# Patient Record
Sex: MALE | Race: WHITE | Employment: PART TIME | URBAN - METROPOLITAN AREA
[De-identification: names, ages, dates, MRNs, and addresses within clinical notes are randomized per-mention and may not be internally consistent; named-entity substitution may affect disease eponyms.]

---

## 2017-01-05 ENCOUNTER — APPOINTMENT (OUTPATIENT)
Dept: CT IMAGING | Facility: HOSPITAL | Age: 37
End: 2017-01-05
Attending: PHYSICIAN ASSISTANT
Payer: MEDICARE

## 2017-01-05 ENCOUNTER — APPOINTMENT (OUTPATIENT)
Dept: GENERAL RADIOLOGY | Facility: HOSPITAL | Age: 37
End: 2017-01-05
Attending: PHYSICIAN ASSISTANT
Payer: MEDICARE

## 2017-01-05 ENCOUNTER — HOSPITAL ENCOUNTER (EMERGENCY)
Facility: HOSPITAL | Age: 37
Discharge: HOME | End: 2017-01-05
Attending: EMERGENCY MEDICINE
Payer: MEDICARE

## 2017-01-05 VITALS
HEART RATE: 74 BPM | DIASTOLIC BLOOD PRESSURE: 68 MMHG | WEIGHT: 155 LBS | OXYGEN SATURATION: 100 % | RESPIRATION RATE: 16 BRPM | TEMPERATURE: 97 F | HEIGHT: 67 IN | BODY MASS INDEX: 24.33 KG/M2 | SYSTOLIC BLOOD PRESSURE: 124 MMHG

## 2017-01-05 DIAGNOSIS — S20.229A BACK CONTUSION, UNSPECIFIED LATERALITY, INITIAL ENCOUNTER: ICD-10-CM

## 2017-01-05 DIAGNOSIS — S16.1XXA CERVICAL STRAIN, INITIAL ENCOUNTER: Primary | ICD-10-CM

## 2017-01-05 PROCEDURE — 96372 THER/PROPH/DIAG INJ SC/IM: CPT

## 2017-01-05 PROCEDURE — 99284 EMERGENCY DEPT VISIT MOD MDM: CPT

## 2017-01-05 PROCEDURE — 72125 CT NECK SPINE W/O DYE: CPT

## 2017-01-05 PROCEDURE — 71020 XR CHEST PA + LAT CHEST (CPT=71020): CPT

## 2017-01-05 RX ORDER — HYDROCODONE BITARTRATE AND ACETAMINOPHEN 5; 325 MG/1; MG/1
1 TABLET ORAL EVERY 6 HOURS PRN
Qty: 20 TABLET | Refills: 0 | Status: SHIPPED | OUTPATIENT
Start: 2017-01-05 | End: 2017-01-12

## 2017-01-05 RX ORDER — HYDROCODONE BITARTRATE AND ACETAMINOPHEN 5; 325 MG/1; MG/1
1 TABLET ORAL ONCE
Status: COMPLETED | OUTPATIENT
Start: 2017-01-05 | End: 2017-01-05

## 2017-01-05 RX ORDER — CYCLOBENZAPRINE HCL 10 MG
10 TABLET ORAL 3 TIMES DAILY PRN
Qty: 20 TABLET | Refills: 0 | Status: SHIPPED | OUTPATIENT
Start: 2017-01-05 | End: 2017-01-12

## 2017-01-05 RX ORDER — KETOROLAC TROMETHAMINE 30 MG/ML
60 INJECTION, SOLUTION INTRAMUSCULAR; INTRAVENOUS ONCE
Status: COMPLETED | OUTPATIENT
Start: 2017-01-05 | End: 2017-01-05

## 2017-01-05 NOTE — ED PROVIDER NOTES
I reviewed that chart and discussed the case.   I have examined the patient and noted patient is moving upper exam is without difficulty with pulse 2+ strength 5 out of 5 capillary refill less than 2 seconds lungs are normal cardia vascular exam shows regul

## 2017-01-05 NOTE — ED PROVIDER NOTES
Patient Seen in: [facility] Emergency Department    History   Patient presents with:  Contusion (musculoskeletal)    Stated Complaint: slipped on ice- shoulder pain    HPI    Patient is a pleasant 36-year-old male.   Patient arrives for evaluation of of beer per week      Review of Systems    Positive for stated complaint: slipped on ice- shoulder pain  Other systems are as noted in HPI. Constitutional and vital signs reviewed. All other systems reviewed and negative except as noted above.     PSF (CPT=71020)  INDICATIONS:  slipped on ice- shoulder pain  COMPARISON:  None. TECHNIQUE:  PA and lateral chest radiographs were obtained. PATIENT STATED HISTORY:  Pt states he fell on the ice 2 days ago.   Pt states since that time he has  pain in the neck There are multiple lymph nodes in the bilateral anterior and posterior vials the majority which are nonenlarged however there is a 1.5 x 2.4 x 1.5 cm enlarged left jugular region node noted near the angle of the mandible.  Incidental note is made of a coupl Prescribed:  Discharge Medication List as of 1/5/2017  5:59 PM    START taking these medications    HYDROcodone-acetaminophen 5-325 MG Oral Tab  Take 1 tablet by mouth every 6 (six) hours as needed for Pain., Script printed, Disp-20 tablet, R-0    Cycloben

## 2017-01-05 NOTE — ED INITIAL ASSESSMENT (HPI)
Pt states he fell on the ice 2 days ago. Pt states since that time he has pain in the neck. Pt has a history of 6 bulging discs in the cervical spine. Pt has pain with ROM of the neck and shoulders with increasing pain upon wakening.   Pt is starting phy

## 2017-01-16 ENCOUNTER — TELEPHONE (OUTPATIENT)
Dept: FAMILY MEDICINE CLINIC | Facility: CLINIC | Age: 37
End: 2017-01-16

## 2017-01-16 ENCOUNTER — MED REC SCAN ONLY (OUTPATIENT)
Dept: FAMILY MEDICINE CLINIC | Facility: CLINIC | Age: 37
End: 2017-01-16

## 2017-01-16 NOTE — TELEPHONE ENCOUNTER
See above. We have reports from [facility] 1/5/17----CT of C-spine and CXR. I'm not sure what else he is referring to. HAVE YOU SEEN ANYTHING? ?

## 2017-01-18 ENCOUNTER — OFFICE VISIT (OUTPATIENT)
Dept: FAMILY MEDICINE CLINIC | Facility: CLINIC | Age: 37
End: 2017-01-18

## 2017-01-18 ENCOUNTER — APPOINTMENT (OUTPATIENT)
Dept: LAB | Age: 37
End: 2017-01-18
Attending: FAMILY MEDICINE
Payer: MEDICARE

## 2017-01-18 VITALS
OXYGEN SATURATION: 99 % | BODY MASS INDEX: 24 KG/M2 | DIASTOLIC BLOOD PRESSURE: 80 MMHG | HEART RATE: 93 BPM | SYSTOLIC BLOOD PRESSURE: 120 MMHG | WEIGHT: 154.38 LBS | TEMPERATURE: 98 F

## 2017-01-18 DIAGNOSIS — Z13.220 SCREENING FOR HYPERCHOLESTEROLEMIA: ICD-10-CM

## 2017-01-18 DIAGNOSIS — Z00.00 LABORATORY EXAM ORDERED AS PART OF ROUTINE GENERAL MEDICAL EXAMINATION: ICD-10-CM

## 2017-01-18 DIAGNOSIS — S16.1XXD CERVICAL STRAIN, ACUTE, SUBSEQUENT ENCOUNTER: Primary | ICD-10-CM

## 2017-01-18 LAB
ALBUMIN SERPL-MCNC: 4.3 G/DL (ref 3.5–4.8)
ALP LIVER SERPL-CCNC: 62 U/L (ref 45–117)
ALT SERPL-CCNC: 58 U/L (ref 17–63)
AST SERPL-CCNC: 34 U/L (ref 15–41)
BILIRUB SERPL-MCNC: 1.2 MG/DL (ref 0.1–2)
BUN BLD-MCNC: 12 MG/DL (ref 8–20)
CALCIUM BLD-MCNC: 9 MG/DL (ref 8.3–10.3)
CHLORIDE: 102 MMOL/L (ref 101–111)
CHOLEST SMN-MCNC: 212 MG/DL (ref ?–200)
CO2: 25 MMOL/L (ref 22–32)
CREAT BLD-MCNC: 0.83 MG/DL (ref 0.7–1.3)
GLUCOSE BLD-MCNC: 84 MG/DL (ref 70–99)
HDLC SERPL-MCNC: 71 MG/DL (ref 45–?)
HDLC SERPL: 2.99 {RATIO} (ref ?–4.97)
LDLC SERPL CALC-MCNC: 121 MG/DL (ref ?–130)
M PROTEIN MFR SERPL ELPH: 7.5 G/DL (ref 6.1–8.3)
NONHDLC SERPL-MCNC: 141 MG/DL (ref ?–130)
POTASSIUM SERPL-SCNC: 3.7 MMOL/L (ref 3.6–5.1)
SODIUM SERPL-SCNC: 135 MMOL/L (ref 136–144)
TRIGLYCERIDES: 100 MG/DL (ref ?–150)
VLDL: 20 MG/DL (ref 5–40)

## 2017-01-18 PROCEDURE — 80061 LIPID PANEL: CPT

## 2017-01-18 PROCEDURE — 36415 COLL VENOUS BLD VENIPUNCTURE: CPT

## 2017-01-18 PROCEDURE — 99214 OFFICE O/P EST MOD 30 MIN: CPT | Performed by: FAMILY MEDICINE

## 2017-01-18 PROCEDURE — 80053 COMPREHEN METABOLIC PANEL: CPT

## 2017-01-18 NOTE — PROGRESS NOTES
Latoya Ng. is a 29year old male. Patient presents with: Other: ongoing neck injury from a while ago--getting worse. ...first initial injury was approx 1 year ago--pt states he has seen neurologist, not sleeping well. ...room 1      HPI:   Kristie NEURO: denies headaches    EXAM:   /80 mmHg  Pulse 93  Temp(Src) 97.7 °F (36.5 °C) (Tympanic)  Wt 154 lb 6 oz  SpO2 99%  GENERAL: well developed, well nourished,in no apparent distress  SKIN: no rashes,no suspicious lesionsl  NECK: supple, no cervi fracture identified. Slight retrolisthesis of C3 relative to C2 noted. If there is high in the cervical pain and there is concern for ligamentous instability flexion and extension    views could be performed.      2.  Nonspecific enlarged left jugular regio

## 2017-01-23 ENCOUNTER — OFFICE VISIT (OUTPATIENT)
Dept: FAMILY MEDICINE CLINIC | Facility: CLINIC | Age: 37
End: 2017-01-23

## 2017-01-23 VITALS
OXYGEN SATURATION: 98 % | BODY MASS INDEX: 24 KG/M2 | SYSTOLIC BLOOD PRESSURE: 120 MMHG | HEART RATE: 76 BPM | TEMPERATURE: 98 F | WEIGHT: 153 LBS | DIASTOLIC BLOOD PRESSURE: 72 MMHG

## 2017-01-23 DIAGNOSIS — F41.9 CHRONIC ANXIETY: ICD-10-CM

## 2017-01-23 DIAGNOSIS — M43.6 NECK STIFFNESS: Primary | ICD-10-CM

## 2017-01-23 PROCEDURE — 99214 OFFICE O/P EST MOD 30 MIN: CPT | Performed by: FAMILY MEDICINE

## 2017-01-23 NOTE — PATIENT INSTRUCTIONS
I would recommend the patient continue physical therapy. He may use moist heat and ibuprofen as needed for pain or discomfort  Patient was instructed in diaphragmatic breathing. Discussed muscle activation.   Patient was given website information to look

## 2017-01-23 NOTE — PROGRESS NOTES
HPI:    Patient ID: Latoya Cerda is a 29year old male. Patient presents with:  Pain: neck pain   patient has a long history of chronic neck pain with radiology studies dating back to 2013.   At that time plain films of the C-spine showed loss of c ( Slight restriction in rotational range of motion but symmetrical), tenderness ( No pain with axial loading) and spasm ( Tight, tender right paracervical). He exhibits no bony tenderness and no pain.      INITIAL MOVEMENT PATTERN: 333 jaw RIGHT,  333 jaw L muscle activation. Patient was given website information to look into this as I feel it would be helpful for him. Schedule for formal activation if desired. Discussed the role of anxiety and feeling of overall well-being.   Patient declines the need for

## 2017-01-25 ENCOUNTER — PATIENT OUTREACH (OUTPATIENT)
Dept: FAMILY MEDICINE CLINIC | Facility: CLINIC | Age: 37
End: 2017-01-25

## 2017-02-01 ENCOUNTER — OFFICE VISIT (OUTPATIENT)
Dept: FAMILY MEDICINE CLINIC | Facility: CLINIC | Age: 37
End: 2017-02-01

## 2017-02-01 VITALS
WEIGHT: 153 LBS | BODY MASS INDEX: 24 KG/M2 | SYSTOLIC BLOOD PRESSURE: 120 MMHG | TEMPERATURE: 98 F | OXYGEN SATURATION: 99 % | DIASTOLIC BLOOD PRESSURE: 70 MMHG | HEART RATE: 78 BPM

## 2017-02-01 DIAGNOSIS — M99.02 THORACIC SEGMENT DYSFUNCTION: ICD-10-CM

## 2017-02-01 DIAGNOSIS — F41.9 CHRONIC ANXIETY: ICD-10-CM

## 2017-02-01 DIAGNOSIS — M99.01 CERVICAL SEGMENT DYSFUNCTION: ICD-10-CM

## 2017-02-01 DIAGNOSIS — M99.03 SOMATIC DYSFUNCTION OF LUMBAR REGION: ICD-10-CM

## 2017-02-01 DIAGNOSIS — M99.00 HEAD REGION SOMATIC DYSFUNCTION: ICD-10-CM

## 2017-02-01 DIAGNOSIS — M43.6 NECK STIFFNESS: Primary | ICD-10-CM

## 2017-02-01 DIAGNOSIS — M99.08 RIB CAGE REGION SOMATIC DYSFUNCTION: ICD-10-CM

## 2017-02-01 PROCEDURE — 98929 OSTEOPATH MANJ 9-10 REGIONS: CPT | Performed by: FAMILY MEDICINE

## 2017-02-01 PROCEDURE — 99213 OFFICE O/P EST LOW 20 MIN: CPT | Performed by: FAMILY MEDICINE

## 2017-02-01 NOTE — PROGRESS NOTES
HPI:    Patient ID: Valdemar Penaloza. is a 29year old male. Patient presents with:  Neck Pain: MAT----    HPI patient with chronic neck pain for the last 7-8 months, records note neck issues for at least the last several years.   Unremarkable MRI and p rapid and/or pressured. He is hyperactive.  Cognition and memory are normal.       COMPLAINT: chronic neck stiffness    INITIAL MOVEMENT PATTERN: 333 jaw RIGHT,  333 jaw LEFT  BREATHING: thoracic PATTERN  PSOAS:  0 RIGHT, 0 LEFT  GLUTES:  0 RIGHT, 0 LEFT  H again instructed in diaphragmatic activation and abdominal breathing pattern  Patient instructed in zone 1 self activation techniques. We discussed relaxation breathing, journaling as well as individual psychotherapy.   Patient was again advised to contact

## 2017-02-01 NOTE — PATIENT INSTRUCTIONS
Patient was again instructed in diaphragmatic activation and abdominal breathing pattern  Patient instructed in zone 1 self activation techniques. We discussed relaxation breathing, journaling as well as individual psychotherapy.   Patient was again advise

## 2017-02-08 ENCOUNTER — TELEPHONE (OUTPATIENT)
Dept: FAMILY MEDICINE CLINIC | Facility: CLINIC | Age: 37
End: 2017-02-08

## 2017-02-10 ENCOUNTER — MED REC SCAN ONLY (OUTPATIENT)
Dept: FAMILY MEDICINE CLINIC | Facility: CLINIC | Age: 37
End: 2017-02-10

## 2017-04-27 ENCOUNTER — HOSPITAL ENCOUNTER (EMERGENCY)
Facility: HOSPITAL | Age: 37
Discharge: HOME | End: 2017-04-27
Attending: EMERGENCY MEDICINE
Payer: MEDICARE

## 2017-04-27 ENCOUNTER — APPOINTMENT (OUTPATIENT)
Dept: GENERAL RADIOLOGY | Facility: HOSPITAL | Age: 37
End: 2017-04-27
Attending: EMERGENCY MEDICINE
Payer: MEDICARE

## 2017-04-27 VITALS
DIASTOLIC BLOOD PRESSURE: 73 MMHG | HEIGHT: 67 IN | TEMPERATURE: 97 F | SYSTOLIC BLOOD PRESSURE: 122 MMHG | BODY MASS INDEX: 25.11 KG/M2 | WEIGHT: 160 LBS | OXYGEN SATURATION: 98 % | HEART RATE: 62 BPM | RESPIRATION RATE: 16 BRPM

## 2017-04-27 DIAGNOSIS — M54.2 NECK PAIN: Primary | ICD-10-CM

## 2017-04-27 DIAGNOSIS — M54.6 ACUTE MIDLINE THORACIC BACK PAIN: ICD-10-CM

## 2017-04-27 PROCEDURE — 72072 X-RAY EXAM THORAC SPINE 3VWS: CPT

## 2017-04-27 PROCEDURE — 72050 X-RAY EXAM NECK SPINE 4/5VWS: CPT

## 2017-04-27 PROCEDURE — 99283 EMERGENCY DEPT VISIT LOW MDM: CPT

## 2017-04-27 PROCEDURE — 71020 XR CHEST PA + LAT CHEST (CPT=71020): CPT

## 2017-04-27 RX ORDER — IBUPROFEN 600 MG/1
600 TABLET ORAL EVERY 8 HOURS PRN
Qty: 30 TABLET | Refills: 0 | Status: SHIPPED | OUTPATIENT
Start: 2017-04-27 | End: 2017-05-04

## 2017-04-27 RX ORDER — HYDROCODONE BITARTRATE AND ACETAMINOPHEN 5; 325 MG/1; MG/1
1-2 TABLET ORAL EVERY 4 HOURS PRN
Qty: 30 TABLET | Refills: 0 | Status: SHIPPED | OUTPATIENT
Start: 2017-04-27 | End: 2017-05-04

## 2017-04-27 RX ORDER — IBUPROFEN 800 MG/1
800 TABLET ORAL ONCE
Status: COMPLETED | OUTPATIENT
Start: 2017-04-27 | End: 2017-04-27

## 2017-04-27 RX ORDER — HYDROCODONE BITARTRATE AND ACETAMINOPHEN 5; 300 MG/1; MG/1
TABLET ORAL
COMMUNITY
End: 2017-09-20 | Stop reason: ALTCHOICE

## 2017-04-27 NOTE — ED NOTES
Pt reevaluated by er physician. informed of his test reports and plan of care. For discharge. Pt verbalizing understanding.

## 2017-04-27 NOTE — ED PROVIDER NOTES
Patient Seen in: [facility] Emergency Department    History   Patient presents with:  Neck Pain (musculoskeletal, neurologic)  Back Pain (musculoskeletal)    Stated Complaint: neck and back pain    HPI    43-year-old male presents to the emergency d above.    PSFH elements reviewed from today and agreed except as otherwise stated in HPI.     Physical Exam       ED Triage Vitals   BP 04/27/17 1311 122/70 mmHg   Pulse 04/27/17 1311 68   Resp 04/27/17 1311 16   Temp 04/27/17 1311 96.8 °F (36 °C)   Temp sr back pain    Disposition:  Discharge    Follow-up:  RACH Castillo/ [address]  [phone] 94    Call in 1 day        Medications Prescribed:  Current Discharge Medication List    START taking these medications    ibup

## 2017-04-27 NOTE — ED INITIAL ASSESSMENT (HPI)
Complaining of neck and upper back pain x 6 month. Worst in the morning. Denies any numbness in the extremities. denies any loss of bowel or bladder control. Ambulatory.

## 2017-05-29 ENCOUNTER — HOSPITAL ENCOUNTER (EMERGENCY)
Facility: HOSPITAL | Age: 37
Discharge: HOME | End: 2017-05-29
Attending: EMERGENCY MEDICINE
Payer: MEDICARE

## 2017-05-29 VITALS
OXYGEN SATURATION: 100 % | WEIGHT: 159.81 LBS | TEMPERATURE: 98 F | DIASTOLIC BLOOD PRESSURE: 82 MMHG | SYSTOLIC BLOOD PRESSURE: 147 MMHG | BODY MASS INDEX: 25.08 KG/M2 | HEIGHT: 67.01 IN | RESPIRATION RATE: 20 BRPM | HEART RATE: 79 BPM

## 2017-05-29 DIAGNOSIS — M54.2 NECK PAIN: Primary | ICD-10-CM

## 2017-05-29 PROCEDURE — 99283 EMERGENCY DEPT VISIT LOW MDM: CPT

## 2017-05-29 RX ORDER — HYDROCODONE BITARTRATE AND ACETAMINOPHEN 5; 325 MG/1; MG/1
1-2 TABLET ORAL EVERY 4 HOURS PRN
Qty: 20 TABLET | Refills: 0 | Status: SHIPPED | OUTPATIENT
Start: 2017-05-29 | End: 2017-06-05

## 2017-05-29 NOTE — ED PROVIDER NOTES
Patient Seen in: [facility] Emergency Department    History   Patient presents with:  Medication Administration    Stated Complaint: neck pain, would like pain medication refill    HPI    28-year-old male presents emergency with chief complaint of n 10 Cans of beer per week      Review of Systems    Positive for stated complaint: neck pain, would like pain medication refill  Other systems are as noted in HPI. Constitutional and vital signs reviewed.       All other systems reviewed and negative ex supportive care and return to ER if any change worsening symptoms. Patient well-appearing and was discharged good condition.         Disposition and Plan     Clinical Impression:  Neck pain  (primary encounter diagnosis)    Disposition:  Discharge    [facility]

## 2017-08-06 ENCOUNTER — HOSPITAL ENCOUNTER (EMERGENCY)
Facility: HOSPITAL | Age: 37
Discharge: HOME | End: 2017-08-06
Attending: EMERGENCY MEDICINE
Payer: MEDICARE

## 2017-08-06 VITALS
WEIGHT: 160 LBS | OXYGEN SATURATION: 100 % | HEIGHT: 67 IN | DIASTOLIC BLOOD PRESSURE: 83 MMHG | BODY MASS INDEX: 25.11 KG/M2 | RESPIRATION RATE: 18 BRPM | SYSTOLIC BLOOD PRESSURE: 120 MMHG | HEART RATE: 79 BPM | TEMPERATURE: 99 F

## 2017-08-06 DIAGNOSIS — G89.29 CHRONIC NECK PAIN: Primary | ICD-10-CM

## 2017-08-06 DIAGNOSIS — M54.2 CHRONIC NECK PAIN: Primary | ICD-10-CM

## 2017-08-06 PROCEDURE — 99283 EMERGENCY DEPT VISIT LOW MDM: CPT

## 2017-08-06 RX ORDER — HYDROCODONE BITARTRATE AND ACETAMINOPHEN 5; 325 MG/1; MG/1
1-2 TABLET ORAL EVERY 4 HOURS PRN
Qty: 20 TABLET | Refills: 0 | Status: SHIPPED | OUTPATIENT
Start: 2017-08-06 | End: 2017-08-13

## 2017-08-06 RX ORDER — IBUPROFEN 800 MG/1
800 TABLET ORAL EVERY 8 HOURS PRN
Qty: 30 TABLET | Refills: 0 | Status: SHIPPED | OUTPATIENT
Start: 2017-08-06 | End: 2017-09-20 | Stop reason: ALTCHOICE

## 2017-08-06 NOTE — ED INITIAL ASSESSMENT (HPI)
Pt c/o neck pain, was diagnosed with 6 bulging discs in his neck from MRI 8 months ago. Reports numbness and tingling in neck. Cam Magallanes off roof 1 month ago and seen in ED.

## 2017-08-06 NOTE — ED PROVIDER NOTES
Patient Seen in: [facility] Emergency Department    History   Patient presents with:  Neck Pain (musculoskeletal, neurologic)    Stated Complaint: neck pain    HPI    71-year-old male with a history of chronic neck pain who currently does not have a (37 °C)  Temp src: Tympanic  SpO2: 100 %  O2 Device: None (Room air)    Current:/83   Pulse 79   Temp 98.6 °F (37 °C) (Tympanic)   Resp 18   Ht 170.2 cm (5' 7\")   Wt 72.6 kg   SpO2 100%   BMI 25.06 kg/m²         Physical Exam    General appearance:

## 2017-08-15 ENCOUNTER — OFFICE VISIT (OUTPATIENT)
Dept: NEUROLOGY | Facility: CLINIC | Age: 37
End: 2017-08-15

## 2017-08-15 VITALS
BODY MASS INDEX: 24.71 KG/M2 | WEIGHT: 163 LBS | HEART RATE: 79 BPM | RESPIRATION RATE: 16 BRPM | DIASTOLIC BLOOD PRESSURE: 75 MMHG | SYSTOLIC BLOOD PRESSURE: 114 MMHG | HEIGHT: 68 IN

## 2017-08-15 DIAGNOSIS — M54.2 CHRONIC MIDLINE POSTERIOR NECK PAIN: Primary | ICD-10-CM

## 2017-08-15 DIAGNOSIS — M47.812 FACET ARTHROPATHY, CERVICAL: ICD-10-CM

## 2017-08-15 DIAGNOSIS — M62.838 CERVICAL PARASPINAL MUSCLE SPASM: ICD-10-CM

## 2017-08-15 DIAGNOSIS — G89.29 CHRONIC MIDLINE POSTERIOR NECK PAIN: Primary | ICD-10-CM

## 2017-08-15 PROCEDURE — 99204 OFFICE O/P NEW MOD 45 MIN: CPT | Performed by: OTHER

## 2017-08-15 RX ORDER — CYCLOBENZAPRINE HCL 10 MG
10 TABLET ORAL NIGHTLY
Qty: 30 TABLET | Refills: 0 | Status: SHIPPED | OUTPATIENT
Start: 2017-08-15 | End: 2017-09-20 | Stop reason: ALTCHOICE

## 2017-08-15 RX ORDER — GABAPENTIN 300 MG/1
CAPSULE ORAL
Qty: 90 CAPSULE | Refills: 0 | Status: SHIPPED | OUTPATIENT
Start: 2017-08-15 | End: 2017-09-20 | Stop reason: ALTCHOICE

## 2017-08-15 NOTE — H&P
[facility] Patient / Consult Visit    Ran Hassan. is a 40year old male.                          Referring MD: None  Patient presents with:  Neck Pain: C/O of chronic neck pain with radiation to the shoulder blades,arms w disorder [facility]    • Bulging of cervical intervertebral disc     c1-c6   • Depression    • Insomnia      Past Surgical History:  No date: HERNIA SURGERY Left      Comment: inguinal  Smoking status: Current Some Day Smoker XII  Optic:    Pupils: equally round and reactive to light with direct and consensual responses, normal accomodation   Visual acuity: Normal              Visual fields: Normal  Oculomotor/Trochlear/Abducens:    Eye Movements: EOMI without nystagmus  Trigem arthropathy. No significant central canal stenosis. C4-C5: Disc desiccation and loss of disc height with central disc protrusion via an annular tear with compression of the ventral aspect of the thecal sac.  Mild bilateral hypertrophic facet joint degene Patient will also be referred to pain management and was recommended to discuss narcotic pain medication with PCP and or pain management.     (M54.2,  G89.29) Chronic midline posterior neck pain  (primary encounter diagnosis)  Plan: Ankit SERNA

## 2017-08-15 NOTE — PATIENT INSTRUCTIONS
Set up appointment with pain management, Dr. Glen Kim at earliest convenience  Start Flexeril 10 mg nightly for neck muscle spasm   Start gabapentin 300 mg as follows: Start at 300 mg nightly x1 week, increased to 300 mg twice day x1 week, then increase to 300 3-10 days. It is highly recommended patients contact their insurance carrier directly to determine coverage. If test is done without insurance authorization, patient may be responsible for the entire amount billed.       Precertification and Prior [facility]

## 2017-09-20 ENCOUNTER — OFFICE VISIT (OUTPATIENT)
Dept: SURGERY | Facility: CLINIC | Age: 37
End: 2017-09-20

## 2017-09-20 VITALS
WEIGHT: 160 LBS | OXYGEN SATURATION: 98 % | HEIGHT: 67 IN | RESPIRATION RATE: 16 BRPM | HEART RATE: 94 BPM | BODY MASS INDEX: 25.11 KG/M2

## 2017-09-20 DIAGNOSIS — M54.12 CERVICAL RADICULITIS: Primary | ICD-10-CM

## 2017-09-20 DIAGNOSIS — M54.14 THORACIC RADICULITIS: ICD-10-CM

## 2017-09-20 PROCEDURE — 99203 OFFICE O/P NEW LOW 30 MIN: CPT | Performed by: ANESTHESIOLOGY

## 2017-09-20 RX ORDER — METHYLPREDNISOLONE 4 MG/1
TABLET ORAL
Qty: 1 PACKAGE | Refills: 0 | Status: SHIPPED | OUTPATIENT
Start: 2017-09-20 | End: 2017-10-21 | Stop reason: ALTCHOICE

## 2017-09-20 RX ORDER — GABAPENTIN 100 MG/1
100 CAPSULE ORAL 3 TIMES DAILY
Qty: 90 CAPSULE | Refills: 0 | Status: SHIPPED | OUTPATIENT
Start: 2017-09-20 | End: 2017-10-21 | Stop reason: ALTCHOICE

## 2017-09-20 RX ORDER — NAPROXEN 500 MG/1
500 TABLET ORAL 2 TIMES DAILY WITH MEALS
Qty: 60 TABLET | Refills: 2 | Status: SHIPPED | OUTPATIENT
Start: 2017-09-20 | End: 2017-12-21

## 2017-09-20 NOTE — PROGRESS NOTES
HPI:    Patient ID: Coy Arroyo. is a 40year old male. HPI    Review of Systems         Current Outpatient Prescriptions:  gabapentin 100 MG Oral Cap Take 1 capsule (100 mg total) by mouth 3 (three) times daily.  Disp: 90 capsule Rfl: 0   Napr Past Treatments for Current Pain Condition:   Physical Therapy and NSAIDS    Prior diagnostic testing for your pain:  nothing

## 2017-09-20 NOTE — PATIENT INSTRUCTIONS
Refill policies:    • Allow 2-3 business days for refills; controlled substances may take longer.   • Contact your pharmacy at least 5 days prior to running out of medication and have them send an electronic request or submit request through the [facility] have a procedure or additional testing performed. [facility]) will contact your insurance carrier to obtain pre-certification or prior authorization.     Unfortunately, [facility] has seen an increase in denial of payment even though the p

## 2017-09-24 NOTE — PROGRESS NOTES
Name: Emre Alcantara : 8/10/1980   DOS: 2017     Chief complaint: Neck and thoracic pain    History of present illness:  Emre Alcantara is a 40year old male complaining of pain in the neck and thoracic area for many years.   But h History:  No date: HERNIA SURGERY Left      Comment: inguinal   Family History   Problem Relation Age of Onset   • Anxiety Sister      Smoking status: Current Some Day Smoker                                                    Packs/day: 0.50      Years: 0. paravertebral and trapezius muscles. Flexion of the neck makes the pain better, extention and lateral rotation of the neck makes the pain worse. Elevation of the head makes the pain better, compression of  the head makes the pain worse.  Spurling’s test is N   Hanson:    N    N   Ankle Clonus:    N                          N  Sensory Examination:    (R)   (L)   LI:      N                          N   L2:      N     N   L3:      N               N   L4:      N                          N   L

## 2017-09-27 ENCOUNTER — TELEPHONE (OUTPATIENT)
Dept: SURGERY | Facility: CLINIC | Age: 37
End: 2017-09-27

## 2017-09-27 DIAGNOSIS — Z13.89 ENCOUNTER FOR IMAGING TO SCREEN FOR METAL PRIOR TO MAGNETIC RESONANCE IMAGING (MRI): Primary | ICD-10-CM

## 2017-09-27 NOTE — TELEPHONE ENCOUNTER
Nicky Alfaro, from [facility] in [place], [place] calling from MRI department as pt scheduled for MRI, however works with metal as a  and requiring [facility] x-ray per protocol.   Order to be faxed to:  [phone] -- Central schedule fax numb

## 2017-10-09 ENCOUNTER — TELEPHONE (OUTPATIENT)
Dept: SURGERY | Facility: CLINIC | Age: 37
End: 2017-10-09

## 2017-10-10 NOTE — TELEPHONE ENCOUNTER
Spoke with pt regarding prescriptions. Pt has finished steroid dose pack and is still taking Gabapentin and Naproxin. Pt c/o neck pain which is not relieved by the Naproxin.  Pt also states the medications are causing him increased anxiety, which is more th

## 2017-10-12 NOTE — TELEPHONE ENCOUNTER
Returned pt's call. Reminded pt that he must get us a copy of the MRI so our providers can look at it to determine if he needs injections. Pt states he will get a copy or have one sent to us. Pt wishing to schedule f/u to discuss injections.  Call transferr

## 2017-10-16 ENCOUNTER — TELEPHONE (OUTPATIENT)
Dept: SURGERY | Facility: CLINIC | Age: 37
End: 2017-10-16

## 2017-10-16 NOTE — TELEPHONE ENCOUNTER
Pt calling regarding MRI results. Informed pt our office has not received MRI. Pt upset stating he will contact [facility] and get copy to bring in to office. No further needs at this time.

## 2017-10-16 NOTE — TELEPHONE ENCOUNTER
Spoke w/ pt and informed pt imaging received today. Also informed pt call back would be received when provider recommendations made available. Pt verbalized understanding and agreeable to plan. No further needs.

## 2017-10-17 ENCOUNTER — MED REC SCAN ONLY (OUTPATIENT)
Dept: NEUROLOGY | Facility: CLINIC | Age: 37
End: 2017-10-17

## 2017-10-19 ENCOUNTER — TELEPHONE (OUTPATIENT)
Dept: SURGERY | Facility: CLINIC | Age: 37
End: 2017-10-19

## 2017-10-19 DIAGNOSIS — M47.812 ARTHROPATHY OF CERVICAL FACET JOINT: Primary | ICD-10-CM

## 2017-10-19 DIAGNOSIS — M47.812 OSTEOARTHRITIS OF CERVICAL SPINE, UNSPECIFIED SPINAL OSTEOARTHRITIS COMPLICATION STATUS: ICD-10-CM

## 2017-10-19 NOTE — TELEPHONE ENCOUNTER
I have left MRI thoracic and cervical spine report and Dr. Edwards Fly folder for his review along with patient's progress note from September 20, 2017 with Dr. Amber Roman.   Please asked patient to bring in imaging from his MRIs on a disc so that we can review the pi

## 2017-10-21 ENCOUNTER — OFFICE VISIT (OUTPATIENT)
Dept: FAMILY MEDICINE CLINIC | Facility: CLINIC | Age: 37
End: 2017-10-21

## 2017-10-21 VITALS
DIASTOLIC BLOOD PRESSURE: 72 MMHG | OXYGEN SATURATION: 98 % | TEMPERATURE: 98 F | WEIGHT: 169 LBS | BODY MASS INDEX: 26.53 KG/M2 | HEIGHT: 67 IN | SYSTOLIC BLOOD PRESSURE: 132 MMHG | HEART RATE: 95 BPM

## 2017-10-21 DIAGNOSIS — F41.9 CHRONIC ANXIETY: ICD-10-CM

## 2017-10-21 DIAGNOSIS — M43.6 NECK STIFFNESS: ICD-10-CM

## 2017-10-21 DIAGNOSIS — K21.9 GASTROESOPHAGEAL REFLUX DISEASE WITHOUT ESOPHAGITIS: ICD-10-CM

## 2017-10-21 DIAGNOSIS — Z00.00 ENCOUNTER FOR ANNUAL HEALTH EXAMINATION: Primary | ICD-10-CM

## 2017-10-21 DIAGNOSIS — F51.04 PSYCHOPHYSIOLOGICAL INSOMNIA: ICD-10-CM

## 2017-10-21 DIAGNOSIS — F17.200 NICOTINE USE DISORDER: ICD-10-CM

## 2017-10-21 PROCEDURE — 99406 BEHAV CHNG SMOKING 3-10 MIN: CPT | Performed by: FAMILY MEDICINE

## 2017-10-21 PROCEDURE — G0444 DEPRESSION SCREEN ANNUAL: HCPCS | Performed by: FAMILY MEDICINE

## 2017-10-21 PROCEDURE — G0008 ADMIN INFLUENZA VIRUS VAC: HCPCS | Performed by: FAMILY MEDICINE

## 2017-10-21 PROCEDURE — 90686 IIV4 VACC NO PRSV 0.5 ML IM: CPT | Performed by: FAMILY MEDICINE

## 2017-10-21 PROCEDURE — G0439 PPPS, SUBSEQ VISIT: HCPCS | Performed by: FAMILY MEDICINE

## 2017-10-21 NOTE — PATIENT INSTRUCTIONS
Mildred Sun Jr.'s SCREENING SCHEDULE   Tests on this list are recommended by your physician but may not be covered, or covered at this frequency, by your insurer. Please check with your insurance carrier before scheduling to verify coverage.     NH more often if abnormal There are no preventive care reminders to display for this patient. Update Health Maintenance if applicable    Flex Sigmoidoscopy Screen  Covered every 5 years No results found for this or any previous visit. No flowsheet data found. be covered with your prescription benefits, but Medicare does not cover unless Medically needed    Zoster (Not covered by Medicare Part B) No orders found for this or any previous visit.  This may be covered with your pharmacy  prescription benefits     Rec servings per day. Avoid spicy or acidic foods and liquids. Moderation of alcohol. Avoid nsaids and aspirin. May use Tylenol prn pain  [place].  Melvin Moreno, FAAFP

## 2017-10-21 NOTE — H&P
Serina Mcmullen. is a 40year old male Patient presents with:  Wellness Visit: Medicare AW  Patient showed up 30 minutes late for his appointment and offers no explanation  HPI:   Pt voices concerns of chronic neck pain, chronic insomnia.     Wt Read rashes  EYES:denies blurred vision or double vision, glasses/ contacts: +, last eye exam:?   ENT: right sided nasal congestion, difficulty breathing out of right side of nose, denies PND or ST, denies snoring and reported periods of apnea, Right hearing def Get Up and Go test > 12 seconds?  no   EXAM:   /72   Pulse 95   Temp 98 °F (36.7 °C) (Temporal)   Ht 67\"   Wt 169 lb   SpO2 98%   BMI 26.47 kg/m²   Body mass index is 26.47 kg/m².    GENERAL: well developed, well nourished,in no apparent distress  SK Visit:  No prescriptions requested or ordered in this encounter  Imaging & Consults:  SIMRAN NAVIGATOR  FLULAVAL INFLUENZA VACCINE QUAD PRESERVATIVE FREE 0.5 ML  No Follow-up on file.   Patient Instructions       Bal Leiva Jr.'s SCREENING SCHEDULE   Te years old and have smoked more than 100 cigarettes in their lifetime   • Anyone with a family history    Colorectal Cancer Screening Covered up to Age 76     Colonoscopy Screen   Covered every 10 years- more often if abnormal There are no preventive care r Homosexual men   Illicit injectable drug abusers     Tetanus Toxoid- Only covered with a cut with metal- TD and TDaP Not covered by Medicare Part B) No orders found for this or any previous visit.  This may be covered with your prescription benefits, but Gastroesophageal reflux disease without esophagitis  Anti-reflux measures reviewed. Avoid large meals. Allow at least 3 hrs between eating and laying down to facilitate gastric emptying. Limit caffeine to 2 servings per day.   Avoid spicy or acidic foods

## 2017-10-27 NOTE — TELEPHONE ENCOUNTER
Dr. Jason Browning has reviewed MRI cervical and thoracic report. He has recommended that for patient's neck pain we began with left and right cervical facet joint injections, I have placed case requests today.   Which please contact patient to see if he would like

## 2017-10-27 NOTE — TELEPHONE ENCOUNTER
Spoke with patient and informed patient of message below. Pt verbalized understanding. No further questions at this time. Spoke with patient and scheduled injections. Reviewed pre-op instructions.  Patient verbalized understanding, no further questions at t ? If you have an implanted Spinal Cord or Peripheral Nerve Stimulator: Please remember to turn device off for procedure      Medication:   Number of days you need to be off for the following medications:  ? Aggrenox 10 days   ?  Agrylin (Anagrelide) 10 days If you are a diabetic, please increase the frequency of your glucose monitoring after the procedure as this may cause a temporary increase in your blood sugar.   Contact your primary care physician if your blood sugar rises as you may require some medicatio The facet joint injection involves inserting a needle through skin and deeper tissues. There is some pain involved. However the skin and deeper tissues are numbed with a local anesthetic before inserting the injection needle.  Once numbed, placing the injec If the first facet joint injection does not relieve your symptoms within one week, a second injection maybe recommended.  Similarly, if the second facet joint injection does not completely relieve your symptoms in 1 to 2 weeks a third injection maybe recomm

## 2017-11-15 ENCOUNTER — OFFICE VISIT (OUTPATIENT)
Dept: FAMILY MEDICINE CLINIC | Facility: CLINIC | Age: 37
End: 2017-11-15

## 2017-11-15 VITALS
SYSTOLIC BLOOD PRESSURE: 130 MMHG | TEMPERATURE: 98 F | HEART RATE: 74 BPM | HEIGHT: 67 IN | WEIGHT: 159 LBS | DIASTOLIC BLOOD PRESSURE: 78 MMHG | OXYGEN SATURATION: 99 % | BODY MASS INDEX: 24.96 KG/M2

## 2017-11-15 DIAGNOSIS — B36.0 TINEA VERSICOLOR: Primary | ICD-10-CM

## 2017-11-15 DIAGNOSIS — M43.6 NECK STIFFNESS: ICD-10-CM

## 2017-11-15 DIAGNOSIS — F41.9 CHRONIC ANXIETY: ICD-10-CM

## 2017-11-15 PROCEDURE — 99213 OFFICE O/P EST LOW 20 MIN: CPT | Performed by: FAMILY MEDICINE

## 2017-11-15 NOTE — PROGRESS NOTES
HPI:    Patient ID: Tawnya Ball. is a 40year old male.   Patient presents with:  Back Pain: F/U -  Side Effect: FROM MEDS?    HPI pt saw neurologist and was given naproxsyn a month ago and steroid cream for \"spots on my back\" after starting nap I advised patient to stop using steroid cream that the rash is not related to his medication. I discussed the diagnosis is a superficial fungal infection.   Would recommend the use of Selsun Blue shampoo as a body wash 3 times weekly  Follow-up with pain c This fungus can come back again (recur) after treatment. To prevent return of the rash, use medicated dandruff shampoo over your whole body when in the shower. Do this once a month for the next year. This is very important to do in the summertime.  That is

## 2017-11-15 NOTE — PATIENT INSTRUCTIONS
I advised patient to stop using steroid cream that the rash is not related to his medication. I discussed the diagnosis is a superficial fungal infection.   Would recommend the use of Selsun Blue shampoo as a body wash 3 times weekly  Follow-up with pain c treatment. To prevent return of the rash, use medicated dandruff shampoo over your whole body when in the shower. Do this once a month for the next year. This is very important to do in the summertime. That is when the rash is most likely to recur.   Other

## 2017-12-18 ENCOUNTER — TELEPHONE (OUTPATIENT)
Dept: NEUROLOGY | Facility: CLINIC | Age: 37
End: 2017-12-18

## 2017-12-18 NOTE — TELEPHONE ENCOUNTER
Spoke w/ pt and reviewed instructions, procedure date 12/19 and arrival time 145. Pt verbalized understanding and appreciation. No further needs.     [address]  PRE-PROCEDURE INSTRUCTIONS WITH IV SEDATION    Prior to the procedure:  Justyn ? Greater than 81 mg (325mg) 7 days  ? Coumadin Procedure may be cancelled if INR is elevated. ? Epidural ____ - 7 days  ? Others 5 days  ? Excedrin (with aspirin) 7 days  ? Plavix (Clopidogrel)  ? Epidural ____ - 10 days  ?  Others 7 days   NSAIDs: 24 ho

## 2017-12-19 ENCOUNTER — APPOINTMENT (OUTPATIENT)
Dept: GENERAL RADIOLOGY | Facility: HOSPITAL | Age: 37
End: 2017-12-19
Attending: ANESTHESIOLOGY
Payer: MEDICARE

## 2017-12-19 ENCOUNTER — HOSPITAL ENCOUNTER (OUTPATIENT)
Facility: HOSPITAL | Age: 37
Setting detail: HOSPITAL OUTPATIENT SURGERY
Discharge: HOME | End: 2017-12-19
Attending: ANESTHESIOLOGY | Admitting: ANESTHESIOLOGY
Payer: MEDICARE

## 2017-12-19 ENCOUNTER — TELEPHONE (OUTPATIENT)
Dept: SURGERY | Facility: CLINIC | Age: 37
End: 2017-12-19

## 2017-12-19 ENCOUNTER — SURGERY (OUTPATIENT)
Age: 37
End: 2017-12-19

## 2017-12-19 VITALS
SYSTOLIC BLOOD PRESSURE: 110 MMHG | DIASTOLIC BLOOD PRESSURE: 69 MMHG | RESPIRATION RATE: 19 BRPM | TEMPERATURE: 98 F | OXYGEN SATURATION: 97 % | HEART RATE: 82 BPM

## 2017-12-19 DIAGNOSIS — M47.812 ARTHROPATHY OF CERVICAL FACET JOINT: ICD-10-CM

## 2017-12-19 DIAGNOSIS — M47.812 OSTEOARTHRITIS OF CERVICAL SPINE, UNSPECIFIED SPINAL OSTEOARTHRITIS COMPLICATION STATUS: ICD-10-CM

## 2017-12-19 PROCEDURE — 99152 MOD SED SAME PHYS/QHP 5/>YRS: CPT | Performed by: ANESTHESIOLOGY

## 2017-12-19 PROCEDURE — 3E0U33Z INTRODUCTION OF ANTI-INFLAMMATORY INTO JOINTS, PERCUTANEOUS APPROACH: ICD-10-PCS | Performed by: ANESTHESIOLOGY

## 2017-12-19 RX ORDER — DEXAMETHASONE SODIUM PHOSPHATE 10 MG/ML
INJECTION, SOLUTION INTRAMUSCULAR; INTRAVENOUS AS NEEDED
Status: DISCONTINUED | OUTPATIENT
Start: 2017-12-19 | End: 2017-12-19 | Stop reason: HOSPADM

## 2017-12-19 RX ORDER — HYDROCODONE BITARTRATE AND ACETAMINOPHEN 5; 325 MG/1; MG/1
1 TABLET ORAL EVERY 4 HOURS PRN
Status: CANCELLED | OUTPATIENT
Start: 2017-12-19

## 2017-12-19 RX ORDER — LIDOCAINE HYDROCHLORIDE 10 MG/ML
INJECTION, SOLUTION EPIDURAL; INFILTRATION; INTRACAUDAL; PERINEURAL AS NEEDED
Status: DISCONTINUED | OUTPATIENT
Start: 2017-12-19 | End: 2017-12-19 | Stop reason: HOSPADM

## 2017-12-19 RX ORDER — ONDANSETRON 2 MG/ML
4 INJECTION INTRAMUSCULAR; INTRAVENOUS ONCE AS NEEDED
Status: CANCELLED | OUTPATIENT
Start: 2017-12-19 | End: 2017-12-19

## 2017-12-19 RX ORDER — ACETAMINOPHEN 325 MG/1
650 TABLET ORAL EVERY 6 HOURS PRN
Status: CANCELLED | OUTPATIENT
Start: 2017-12-19

## 2017-12-19 RX ORDER — SODIUM CHLORIDE, SODIUM LACTATE, POTASSIUM CHLORIDE, CALCIUM CHLORIDE 600; 310; 30; 20 MG/100ML; MG/100ML; MG/100ML; MG/100ML
100 INJECTION, SOLUTION INTRAVENOUS CONTINUOUS
Status: DISCONTINUED | OUTPATIENT
Start: 2017-12-19 | End: 2017-12-19

## 2017-12-19 RX ORDER — DIPHENHYDRAMINE HYDROCHLORIDE 50 MG/ML
50 INJECTION INTRAMUSCULAR; INTRAVENOUS ONCE AS NEEDED
Status: CANCELLED | OUTPATIENT
Start: 2017-12-19 | End: 2017-12-19

## 2017-12-19 RX ORDER — MIDAZOLAM HYDROCHLORIDE 1 MG/ML
INJECTION INTRAMUSCULAR; INTRAVENOUS AS NEEDED
Status: DISCONTINUED | OUTPATIENT
Start: 2017-12-19 | End: 2017-12-19 | Stop reason: HOSPADM

## 2017-12-19 RX ORDER — 0.9 % SODIUM CHLORIDE 0.9 %
VIAL (ML) INJECTION AS NEEDED
Status: DISCONTINUED | OUTPATIENT
Start: 2017-12-19 | End: 2017-12-19 | Stop reason: HOSPADM

## 2017-12-19 RX ORDER — MORPHINE SULFATE 4 MG/ML
2 INJECTION, SOLUTION INTRAMUSCULAR; INTRAVENOUS EVERY 2 HOUR PRN
Status: CANCELLED | OUTPATIENT
Start: 2017-12-19

## 2017-12-19 NOTE — H&P
History & Physical Examination    Patient Name: Bisi Baugh   MRN: [id number]  [facility]: [id number]  YOB: 1980    Pre-Operative Diagnosis:  Arthropathy of cervical facet joint [M12.88]  Osteoarthritis of cervical spine, unspecified spinal

## 2017-12-19 NOTE — TELEPHONE ENCOUNTER
Spoke to pt regarding pre-op instructions. RN explained to him that he needs to hold food and water for 6 hr prior to his injection at 2:45pm today.

## 2017-12-19 NOTE — OPERATIVE REPORT
[facility]  Operative Report  2017     [id number] Carol Moore  Patient Status:  Hospital Outpatient Surgery    8/10/1980 MRN [id number]   Location [address] [place] [address] Attending Rony Carpio MD   Hosp Day # 0 PCP lidocaine. A 22-gauge 3.5\" Quincke spinal needle was introduced and advanced into each corresponding facet joint atraumatically at left C3-C4, C4-C5, C5-C6 and C6-C7 level under fluoroscopic guidance.   Following negative aspiration for CSF and blood, nick

## 2017-12-21 NOTE — TELEPHONE ENCOUNTER
Patient would like rx for his current pain. Not the medication that made him break out into a rash on his back. Please call.

## 2017-12-22 RX ORDER — NAPROXEN 500 MG/1
500 TABLET ORAL 2 TIMES DAILY WITH MEALS
Qty: 60 TABLET | Refills: 2 | Status: SHIPPED | OUTPATIENT
Start: 2017-12-22 | End: 2018-02-16 | Stop reason: ALTCHOICE

## 2017-12-22 NOTE — TELEPHONE ENCOUNTER
Medication: naproxen 500 mg oral EC    Date of last refill: 09/20  [place]    Last office visit:9/20  Due back to clinic per last office note: not noted HAVING inj 1/02  Date next office visit scheduled:  Nothing sche

## 2017-12-29 ENCOUNTER — TELEPHONE (OUTPATIENT)
Dept: SURGERY | Facility: CLINIC | Age: 37
End: 2017-12-29

## 2018-01-02 ENCOUNTER — SURGERY (OUTPATIENT)
Age: 38
End: 2018-01-02

## 2018-01-02 ENCOUNTER — APPOINTMENT (OUTPATIENT)
Dept: GENERAL RADIOLOGY | Facility: HOSPITAL | Age: 38
End: 2018-01-02
Attending: ANESTHESIOLOGY

## 2018-01-02 ENCOUNTER — HOSPITAL ENCOUNTER (OUTPATIENT)
Facility: HOSPITAL | Age: 38
Setting detail: HOSPITAL OUTPATIENT SURGERY
Discharge: HOME | End: 2018-01-02
Attending: ANESTHESIOLOGY | Admitting: ANESTHESIOLOGY

## 2018-01-02 VITALS
DIASTOLIC BLOOD PRESSURE: 77 MMHG | SYSTOLIC BLOOD PRESSURE: 103 MMHG | OXYGEN SATURATION: 100 % | RESPIRATION RATE: 18 BRPM | HEART RATE: 67 BPM | TEMPERATURE: 98 F

## 2018-01-02 DIAGNOSIS — M47.812 ARTHROPATHY OF CERVICAL FACET JOINT: ICD-10-CM

## 2018-01-02 DIAGNOSIS — M47.812 OSTEOARTHRITIS OF CERVICAL SPINE, UNSPECIFIED SPINAL OSTEOARTHRITIS COMPLICATION STATUS: ICD-10-CM

## 2018-01-02 PROCEDURE — 99152 MOD SED SAME PHYS/QHP 5/>YRS: CPT | Performed by: ANESTHESIOLOGY

## 2018-01-02 PROCEDURE — 3E0U33Z INTRODUCTION OF ANTI-INFLAMMATORY INTO JOINTS, PERCUTANEOUS APPROACH: ICD-10-PCS | Performed by: ANESTHESIOLOGY

## 2018-01-02 RX ORDER — MIDAZOLAM HYDROCHLORIDE 1 MG/ML
INJECTION INTRAMUSCULAR; INTRAVENOUS AS NEEDED
Status: DISCONTINUED | OUTPATIENT
Start: 2018-01-02 | End: 2018-01-02 | Stop reason: HOSPADM

## 2018-01-02 RX ORDER — LIDOCAINE HYDROCHLORIDE 10 MG/ML
INJECTION, SOLUTION EPIDURAL; INFILTRATION; INTRACAUDAL; PERINEURAL AS NEEDED
Status: DISCONTINUED | OUTPATIENT
Start: 2018-01-02 | End: 2018-01-02 | Stop reason: HOSPADM

## 2018-01-02 RX ORDER — SODIUM CHLORIDE, SODIUM LACTATE, POTASSIUM CHLORIDE, CALCIUM CHLORIDE 600; 310; 30; 20 MG/100ML; MG/100ML; MG/100ML; MG/100ML
100 INJECTION, SOLUTION INTRAVENOUS CONTINUOUS
Status: DISCONTINUED | OUTPATIENT
Start: 2018-01-02 | End: 2018-01-02

## 2018-01-02 RX ORDER — DEXAMETHASONE SODIUM PHOSPHATE 10 MG/ML
INJECTION, SOLUTION INTRAMUSCULAR; INTRAVENOUS AS NEEDED
Status: DISCONTINUED | OUTPATIENT
Start: 2018-01-02 | End: 2018-01-02 | Stop reason: HOSPADM

## 2018-01-02 RX ORDER — MORPHINE SULFATE 2 MG/ML
2 INJECTION, SOLUTION INTRAMUSCULAR; INTRAVENOUS EVERY 2 HOUR PRN
Status: CANCELLED | OUTPATIENT
Start: 2018-01-02

## 2018-01-02 RX ORDER — ONDANSETRON 2 MG/ML
4 INJECTION INTRAMUSCULAR; INTRAVENOUS ONCE AS NEEDED
Status: CANCELLED | OUTPATIENT
Start: 2018-01-02 | End: 2018-01-02

## 2018-01-02 RX ORDER — DIPHENHYDRAMINE HYDROCHLORIDE 50 MG/ML
50 INJECTION INTRAMUSCULAR; INTRAVENOUS ONCE AS NEEDED
Status: CANCELLED | OUTPATIENT
Start: 2018-01-02 | End: 2018-01-02

## 2018-01-02 RX ORDER — HYDROCODONE BITARTRATE AND ACETAMINOPHEN 5; 325 MG/1; MG/1
1 TABLET ORAL EVERY 4 HOURS PRN
Status: CANCELLED | OUTPATIENT
Start: 2018-01-02

## 2018-01-02 RX ORDER — ACETAMINOPHEN 325 MG/1
650 TABLET ORAL EVERY 6 HOURS PRN
Status: CANCELLED | OUTPATIENT
Start: 2018-01-02

## 2018-01-02 RX ORDER — 0.9 % SODIUM CHLORIDE 0.9 %
VIAL (ML) INJECTION AS NEEDED
Status: DISCONTINUED | OUTPATIENT
Start: 2018-01-02 | End: 2018-01-02 | Stop reason: HOSPADM

## 2018-01-02 NOTE — H&P
History & Physical Examination    Patient Name: Ana María Mcbride.   MRN: [id number]  [facility]: [id number]  YOB: 1980    Pre-Operative Diagnosis:  Arthropathy of cervical facet joint [M12.88]  Osteoarthritis of cervical spine, unspecified spinal

## 2018-01-04 NOTE — OPERATIVE REPORT
[facility]  Operative Report  2018     Serina Mcmullen.  Patient Status:  Hospital Outpatient Surgery    8/10/1980 MRN [id number]   Location [address] [place] [address] Attending No att. providers found   Hosp Day # 0 PC lidocaine. A 22-gauge 3.5\" Quincke spinal needle was introduced and advanced into each corresponding facet joint atraumatically at right C3-C4, C4-C5, C5-C6 and C6-C7 level under fluoroscopic guidance.   Following negative aspiration for CSF and blood, ap

## 2018-02-06 ENCOUNTER — TELEPHONE (OUTPATIENT)
Dept: SURGERY | Facility: CLINIC | Age: 38
End: 2018-02-06

## 2018-02-06 NOTE — TELEPHONE ENCOUNTER
Spoke w/ pt who states CFJIs have not helped with pain symptoms. Pt stating he would like to be seen in office asap for next steps. Call transferred to [facility] for scheduling - openings available tomorrow. No further needs.

## 2018-02-16 ENCOUNTER — OFFICE VISIT (OUTPATIENT)
Dept: SURGERY | Facility: CLINIC | Age: 38
End: 2018-02-16

## 2018-02-16 VITALS
DIASTOLIC BLOOD PRESSURE: 72 MMHG | SYSTOLIC BLOOD PRESSURE: 122 MMHG | WEIGHT: 159 LBS | BODY MASS INDEX: 24.96 KG/M2 | HEART RATE: 78 BPM | HEIGHT: 67 IN

## 2018-02-16 DIAGNOSIS — M54.12 CERVICAL RADICULITIS: Primary | ICD-10-CM

## 2018-02-16 DIAGNOSIS — M79.18 MYOFASCIAL PAIN: ICD-10-CM

## 2018-02-16 PROCEDURE — 99214 OFFICE O/P EST MOD 30 MIN: CPT | Performed by: NURSE PRACTITIONER

## 2018-02-16 RX ORDER — GABAPENTIN 100 MG/1
100 CAPSULE ORAL EVERY 8 HOURS
Qty: 90 CAPSULE | Refills: 0 | Status: SHIPPED | OUTPATIENT
Start: 2018-02-16 | End: 2018-03-18

## 2018-02-16 RX ORDER — METHOCARBAMOL 500 MG/1
500 TABLET, FILM COATED ORAL EVERY 8 HOURS PRN
Qty: 60 TABLET | Refills: 0 | Status: SHIPPED | OUTPATIENT
Start: 2018-02-16 | End: 2018-03-18

## 2018-02-16 NOTE — PROGRESS NOTES
Name: Laci Hernandez. : 8/10/1980   DOS: 2018     Pain Clinic Follow Up Visit:   Laci Hernandez. is a 40year old male who presents for recheck of his chronic neck pain.     Status post:  18: Right cervical facet joint injection back pain  Neurologic: Denies weakness or bowel/bladder incontinence associated with the pain. Current Outpatient Prescriptions:  methocarbamol 500 MG Oral Tab Take 1 tablet (500 mg total) by mouth every 8 (eight) hours as needed.  Disp: 60 tablet Rfl: encroachment present on either side.   C4-C5 has a mild central disc bulge invaginating the ventral cord without myelomalacia signal.  There is a focus of fluid signal in the disc annulus suggesting an acute annular tear or chronic ingrowth of granulation t can easily view the images. I have educated him to wait until he hears from our insurance precertification team regarding approval for the MRI prior to scheduling. Consider cervical epidural steroid injections.     I have discussed with patient and his gi indicates understanding of these issues and agrees to the plan. Return in about 2 weeks (around 3/2/2018).     LULU Treviño, 2/16/2018, 11:16 AM

## 2018-02-16 NOTE — PATIENT INSTRUCTIONS
Refill policies:    • Allow 2-3 business days for refills; controlled substances may take longer.   • Contact your pharmacy at least 5 days prior to running out of medication and have them send an electronic request or submit request through the [facility] recommended that you have a procedure or additional testing performed. [facility]) will contact your insurance carrier to obtain pre-certification or prior authorization.     Unfortunately, [facility] has seen an increase in denial of paym

## 2018-03-16 ENCOUNTER — TELEPHONE (OUTPATIENT)
Dept: SURGERY | Facility: CLINIC | Age: 38
End: 2018-03-16

## 2018-03-19 ENCOUNTER — MED REC SCAN ONLY (OUTPATIENT)
Dept: FAMILY MEDICINE CLINIC | Facility: CLINIC | Age: 38
End: 2018-03-19

## 2018-04-16 ENCOUNTER — OFFICE VISIT (OUTPATIENT)
Dept: FAMILY MEDICINE CLINIC | Facility: CLINIC | Age: 38
End: 2018-04-16

## 2018-04-16 VITALS
SYSTOLIC BLOOD PRESSURE: 136 MMHG | BODY MASS INDEX: 26 KG/M2 | WEIGHT: 167 LBS | TEMPERATURE: 98 F | HEART RATE: 78 BPM | DIASTOLIC BLOOD PRESSURE: 80 MMHG | OXYGEN SATURATION: 98 %

## 2018-04-16 DIAGNOSIS — S00.01XA ABRASION, SCALP W/O INFECTION: ICD-10-CM

## 2018-04-16 DIAGNOSIS — M43.6 NECK STIFFNESS: ICD-10-CM

## 2018-04-16 DIAGNOSIS — F41.9 CHRONIC ANXIETY: ICD-10-CM

## 2018-04-16 DIAGNOSIS — R59.1 LYMPHADENOPATHY OF HEAD AND NECK: Primary | ICD-10-CM

## 2018-04-16 PROCEDURE — 99213 OFFICE O/P EST LOW 20 MIN: CPT | Performed by: FAMILY MEDICINE

## 2018-04-16 RX ORDER — CEPHALEXIN 500 MG/1
500 CAPSULE ORAL 3 TIMES DAILY
Qty: 30 CAPSULE | Refills: 0 | Status: SHIPPED | OUTPATIENT
Start: 2018-04-16 | End: 2018-05-23 | Stop reason: ALTCHOICE

## 2018-04-16 NOTE — PATIENT INSTRUCTIONS
I reviewed the findings of lymphadenopathy. Discussed possible association with scalp abrasion.   Push fluids  Cephalexin 500 mg 3 times daily ×10 days  Anticipate gradual resolution over the next 2-3 weeks  Discussed importance of smoking cessation

## 2018-04-16 NOTE — PROGRESS NOTES
HPI:    Patient ID: Riley Fong. is a 40year old male.   Patient presents with:  Lump: NECK AND FACE--  here with significant other  HPI noticed \"lumps\" on neck x several days, no recent illnesses, no ST, no fever, chills, no problems swallowin Blood pressure 136/80, pulse 78, temperature 97.9 °F (36.6 °C), temperature source Temporal, weight 167 lb, SpO2 98 %.          ASSESSMENT/PLAN:   Lymphadenopathy of head and neck  (primary encounter diagnosis)  Abrasion, scalp w/o infection  Neck stiffnes

## 2018-05-14 ENCOUNTER — TELEPHONE (OUTPATIENT)
Dept: SURGERY | Facility: CLINIC | Age: 38
End: 2018-05-14

## 2018-05-14 NOTE — TELEPHONE ENCOUNTER
Pt calling feeling terrible today. Pt is vague about his pain, but does report pain in the center of his neck. Pt also states that when he turns his head he gets a pinching behind his ear.  Pt states when he is stressed he feels pain across his chest and ha

## 2018-05-23 ENCOUNTER — OFFICE VISIT (OUTPATIENT)
Dept: SURGERY | Facility: CLINIC | Age: 38
End: 2018-05-23

## 2018-05-23 VITALS
WEIGHT: 165 LBS | HEIGHT: 67 IN | BODY MASS INDEX: 25.9 KG/M2 | DIASTOLIC BLOOD PRESSURE: 86 MMHG | HEART RATE: 96 BPM | SYSTOLIC BLOOD PRESSURE: 132 MMHG

## 2018-05-23 DIAGNOSIS — M54.12 CERVICAL RADICULITIS: Primary | ICD-10-CM

## 2018-05-23 PROCEDURE — 99214 OFFICE O/P EST MOD 30 MIN: CPT | Performed by: ANESTHESIOLOGY

## 2018-05-23 NOTE — PATIENT INSTRUCTIONS
Refill policies:    • Allow 2-3 business days for refills; controlled substances may take longer.   • Contact your pharmacy at least 5 days prior to running out of medication and have them send an electronic request or submit request through the “request re entire amount billed. Precertification and Prior Authorizations: If your physician has recommended that you have a procedure or additional testing performed.   [facility]) will contact your insurance carrier to obtain pre-certi

## 2018-05-27 NOTE — PROGRESS NOTES
Name: Denzel Beck. : 8/10/1980   DOS: 2018     Pain Clinic Follow Up Visit:   Denzel Beck. is a 40year old male who presents for recheck of his chronic neck pain. He is status post diagnostic cervical facet joint injection. this encounter. Medications filled today:  No prescriptions requested or ordered in this encounter    Radiology orders and consultations:None    The patient indicates understanding of these issues and agrees to the plan. No Follow-up on file.     Heaven Naik

## 2018-06-04 ENCOUNTER — TELEPHONE (OUTPATIENT)
Dept: SURGERY | Facility: CLINIC | Age: 38
End: 2018-06-04

## 2018-06-04 ENCOUNTER — OFFICE VISIT (OUTPATIENT)
Dept: SURGERY | Facility: CLINIC | Age: 38
End: 2018-06-04

## 2018-06-04 VITALS
BODY MASS INDEX: 25.11 KG/M2 | HEIGHT: 67 IN | SYSTOLIC BLOOD PRESSURE: 142 MMHG | DIASTOLIC BLOOD PRESSURE: 72 MMHG | HEART RATE: 96 BPM | WEIGHT: 160 LBS

## 2018-06-04 DIAGNOSIS — M54.12 CERVICAL RADICULITIS: Primary | ICD-10-CM

## 2018-06-04 PROCEDURE — 99214 OFFICE O/P EST MOD 30 MIN: CPT | Performed by: ANESTHESIOLOGY

## 2018-06-04 NOTE — PATIENT INSTRUCTIONS
Refill policies:    • Allow 2-3 business days for refills; controlled substances may take longer.   • Contact your pharmacy at least 5 days prior to running out of medication and have them send an electronic request or submit request through the “request re entire amount billed. Precertification and Prior Authorizations: If your physician has recommended that you have a procedure or additional testing performed.   [facility]) will contact your insurance carrier to obtain pre-certi

## 2018-06-06 ENCOUNTER — CHARTING TRANS (OUTPATIENT)
Dept: OTHER | Age: 38
End: 2018-06-06

## 2018-06-09 NOTE — PROGRESS NOTES
Name: Tawnya Ball. : 8/10/1980   DOS: 2018     Pain Clinic Follow Up Visit:   Tawnya Ball. is a 40year old male who presents for recheck of his chronic neck pain. He is status post diagnostic cervical facet joint injection.   H before I do injection. Orders:No orders of the defined types were placed in this encounter.       Medications filled today:  No prescriptions requested or ordered in this encounter    Radiology orders and consultations:None    The patient indicates under

## 2018-06-12 ENCOUNTER — TELEPHONE (OUTPATIENT)
Dept: FAMILY MEDICINE CLINIC | Facility: CLINIC | Age: 38
End: 2018-06-12

## 2018-06-12 NOTE — TELEPHONE ENCOUNTER
Patient wants disks back, Radiology will create new disk (recycled the old ones) and patient will  at appt with Dr. Alfredo Nice 6/18/18

## 2018-06-12 NOTE — TELEPHONE ENCOUNTER
SURGERY ON  6/27 FOR ENT PROCEDURE WILL CONTACT ENT TO FAX PRE OP ORDERS.     ALSO IS UPSET THAT REPLACEMENT ANTIBIOTIC FOR AMOXICILLIN WAS $58

## 2018-06-12 NOTE — TELEPHONE ENCOUNTER
l/m to c/b----  1. Did he call the ENT's ofc? We haven't rec'd anything yet. What is he having done?   2. I don't know what he is referring to re: the abx????

## 2018-06-18 ENCOUNTER — HOSPITAL ENCOUNTER (OUTPATIENT)
Dept: GENERAL RADIOLOGY | Facility: HOSPITAL | Age: 38
Discharge: HOME | End: 2018-06-18
Attending: PHYSICIAN ASSISTANT
Payer: MEDICARE

## 2018-06-18 ENCOUNTER — OFFICE VISIT (OUTPATIENT)
Dept: SURGERY | Facility: CLINIC | Age: 38
End: 2018-06-18

## 2018-06-18 VITALS
DIASTOLIC BLOOD PRESSURE: 70 MMHG | WEIGHT: 165 LBS | SYSTOLIC BLOOD PRESSURE: 100 MMHG | HEART RATE: 94 BPM | BODY MASS INDEX: 25.9 KG/M2 | HEIGHT: 67 IN

## 2018-06-18 DIAGNOSIS — M54.2 CERVICALGIA: Primary | ICD-10-CM

## 2018-06-18 DIAGNOSIS — M50.30 DDD (DEGENERATIVE DISC DISEASE), CERVICAL: ICD-10-CM

## 2018-06-18 DIAGNOSIS — M54.2 CERVICALGIA: ICD-10-CM

## 2018-06-18 PROCEDURE — 72052 X-RAY EXAM NECK SPINE 6/>VWS: CPT | Performed by: PHYSICIAN ASSISTANT

## 2018-06-18 PROCEDURE — 99204 OFFICE O/P NEW MOD 45 MIN: CPT | Performed by: NEUROLOGICAL SURGERY

## 2018-06-18 NOTE — H&P
Neurosurgery Clinic Visit  2018    Ben Bermudez. PCP:  None Pcp    8/10/1980 MRN [id number]       CC:  Neck Pain    HPI:    Boaz Hansen is a very pleasant 40year old male who presents with chronic neck pain.   Pain can alternate but typically Smokeless tobacco: Current User                      Alcohol use: Yes           6.0 oz/week       Cans of beer: 10 per week       Comment: \"case of beer per week\"    Drug use: No            Other Topics            Concern  Caffeine Concern proceed with FIOR if he is interested. OK to maintain activity and workout as tolerated. XR Cervical flex/ex. F/u prn. Imaging was reviewed with the patient and explained in detail. The diagnosis, treatment options, and expectations were discussed.

## 2018-06-18 NOTE — PATIENT INSTRUCTIONS
Refill policies:    • Allow 2-3 business days for refills; controlled substances may take longer.   • Contact your pharmacy at least 5 days prior to running out of medication and have them send an electronic request or submit request through the “request re entire amount billed. Precertification and Prior Authorizations: If your physician has recommended that you have a procedure or additional testing performed.   [facility]) will contact your insurance carrier to obtain pre-certi

## 2018-06-18 NOTE — PROGRESS NOTES
Location of Pain: Pt states that he is having issues with neck/ back pain. Pt states no leg pain. Pt states bilateral  shoulder weakness. Pt states no balance issues.      Date Pain Began: 6 Months           Work Related:   No        Receiving Work Comp/Dis

## 2018-06-25 ENCOUNTER — CHARTING TRANS (OUTPATIENT)
Dept: OTHER | Age: 38
End: 2018-06-25

## 2018-06-29 ENCOUNTER — OFFICE VISIT (OUTPATIENT)
Dept: FAMILY MEDICINE CLINIC | Facility: CLINIC | Age: 38
End: 2018-06-29

## 2018-06-29 VITALS
SYSTOLIC BLOOD PRESSURE: 122 MMHG | HEIGHT: 67 IN | HEART RATE: 74 BPM | WEIGHT: 153 LBS | OXYGEN SATURATION: 98 % | BODY MASS INDEX: 24.01 KG/M2 | DIASTOLIC BLOOD PRESSURE: 80 MMHG | TEMPERATURE: 98 F

## 2018-06-29 DIAGNOSIS — F41.9 CHRONIC ANXIETY: ICD-10-CM

## 2018-06-29 DIAGNOSIS — J34.2 DEVIATED NASAL SEPTUM: ICD-10-CM

## 2018-06-29 DIAGNOSIS — F17.200 TOBACCO DEPENDENCE: ICD-10-CM

## 2018-06-29 DIAGNOSIS — M43.6 NECK STIFFNESS: ICD-10-CM

## 2018-06-29 DIAGNOSIS — Z01.818 PRE-OP EVALUATION: Primary | ICD-10-CM

## 2018-06-29 DIAGNOSIS — J34.3 HYPERTROPHY OF NASAL TURBINATES: ICD-10-CM

## 2018-06-29 PROCEDURE — 85730 THROMBOPLASTIN TIME PARTIAL: CPT | Performed by: FAMILY MEDICINE

## 2018-06-29 PROCEDURE — 85610 PROTHROMBIN TIME: CPT | Performed by: FAMILY MEDICINE

## 2018-06-29 PROCEDURE — 80048 BASIC METABOLIC PNL TOTAL CA: CPT | Performed by: FAMILY MEDICINE

## 2018-06-29 PROCEDURE — 99214 OFFICE O/P EST MOD 30 MIN: CPT | Performed by: FAMILY MEDICINE

## 2018-06-29 PROCEDURE — 85025 COMPLETE CBC W/AUTO DIFF WBC: CPT | Performed by: FAMILY MEDICINE

## 2018-06-29 PROCEDURE — 36415 COLL VENOUS BLD VENIPUNCTURE: CPT | Performed by: FAMILY MEDICINE

## 2018-06-29 NOTE — H&P
Riley Mclain is a 40year old male who presents for a preoperative evaluation as requested by Dr. Capri Perez  HPI:   Pt is scheduled for septoplasty and turbinate reduction at [facility] surgical center on 7/11/18.     Wt Readings from Last 6 E chest pain on exertion, palpations, anginal equivalent symptoms, no claudication , no peripheral edema  GI: denies abdominal pain,denies heartburn, constipation, diarrhea, or change in bowel habits  : denies dysuria, hesitancy,nocturia, decreased urine s then follow-up for drug screen prior to surgery  Patient advised to avoid all NSAIDs such as ibuprofen, naproxen, Aleve, Advil, Motrin, aspirin for 10 days prior to procedure.   He may use Tylenol if needed  Patient is medically cleared to proceed with proc

## 2018-07-02 ENCOUNTER — TELEPHONE (OUTPATIENT)
Dept: FAMILY MEDICINE CLINIC | Facility: CLINIC | Age: 38
End: 2018-07-02

## 2018-07-02 NOTE — TELEPHONE ENCOUNTER
RECIEVED SURGICAL CLEARANCE & LABS THAT WAS FAXED OVER, NEED DR TO SIGN & DATE SHEET WITH LABS ON IT & FAX BACK  [phone]

## 2018-07-05 NOTE — TELEPHONE ENCOUNTER
Labs printed, called and spoke to [facility] and notified DG is out of office until Monday, this can be addressed then. Printed and will need MD to sign.

## 2018-07-05 NOTE — TELEPHONE ENCOUNTER
THE LAB RESULTS NEED TO BE SIGNED AND DATED BY A DOC. THEY NEVER REC'D THEM.  PLEASE FAX AGAIN. [phone] FAX

## 2018-07-09 ENCOUNTER — CHARTING TRANS (OUTPATIENT)
Dept: OTHER | Age: 38
End: 2018-07-09

## 2018-07-13 NOTE — PATIENT INSTRUCTIONS
Letter written for referral to [facility] and out of for behavioral health evaluation and individual therapy.   Anticipatory guidance and advice provided regarding impulse control, conservative management of anxiety, etc.  Patient counseled against using alcoh

## 2018-07-13 NOTE — PROGRESS NOTES
Daniel Castrejon. is a 40year old male. Patient presents with:  Referral: FOR [facility]----    HPI:   Pt feels he is depressed but periods of happiness, problems with impulse control patient reports a history of going through anger management classes. thoughts, + alcohol/ drug use, +  previous hx of depression/ anxiety  Musculoskeletal: Pain and deformity of right hand  EXAM:   /80   Pulse 92   Temp 98.2 °F (36.8 °C) (Temporal)   Wt 154 lb   SpO2 98%   BMI 24.12 kg/m²   GENERAL: well developed, we

## 2018-07-16 ENCOUNTER — TELEPHONE (OUTPATIENT)
Dept: FAMILY MEDICINE CLINIC | Facility: CLINIC | Age: 38
End: 2018-07-16

## 2018-07-18 ENCOUNTER — TELEPHONE (OUTPATIENT)
Dept: SURGERY | Facility: CLINIC | Age: 38
End: 2018-07-18

## 2018-07-27 ENCOUNTER — APPOINTMENT (OUTPATIENT)
Dept: PHYSICAL THERAPY | Age: 38
End: 2018-07-27
Attending: FAMILY MEDICINE
Payer: MEDICARE

## 2018-07-30 ENCOUNTER — APPOINTMENT (OUTPATIENT)
Dept: PHYSICAL THERAPY | Age: 38
End: 2018-07-30
Attending: FAMILY MEDICINE
Payer: MEDICARE

## 2018-08-06 ENCOUNTER — APPOINTMENT (OUTPATIENT)
Dept: PHYSICAL THERAPY | Age: 38
End: 2018-08-06
Attending: FAMILY MEDICINE
Payer: MEDICARE

## 2018-08-09 ENCOUNTER — APPOINTMENT (OUTPATIENT)
Dept: PHYSICAL THERAPY | Age: 38
End: 2018-08-09
Attending: FAMILY MEDICINE
Payer: MEDICARE

## 2018-08-13 ENCOUNTER — APPOINTMENT (OUTPATIENT)
Dept: PHYSICAL THERAPY | Age: 38
End: 2018-08-13
Attending: FAMILY MEDICINE
Payer: MEDICARE

## 2018-08-16 ENCOUNTER — APPOINTMENT (OUTPATIENT)
Dept: PHYSICAL THERAPY | Age: 38
End: 2018-08-16
Attending: FAMILY MEDICINE
Payer: MEDICARE

## 2018-09-10 ENCOUNTER — MED REC SCAN ONLY (OUTPATIENT)
Dept: FAMILY MEDICINE CLINIC | Facility: CLINIC | Age: 38
End: 2018-09-10

## 2018-10-16 ENCOUNTER — OFFICE VISIT (OUTPATIENT)
Dept: FAMILY MEDICINE CLINIC | Facility: CLINIC | Age: 38
End: 2018-10-16
Payer: MEDICARE

## 2018-10-16 VITALS
OXYGEN SATURATION: 99 % | DIASTOLIC BLOOD PRESSURE: 80 MMHG | BODY MASS INDEX: 25 KG/M2 | TEMPERATURE: 98 F | SYSTOLIC BLOOD PRESSURE: 130 MMHG | HEART RATE: 76 BPM | WEIGHT: 158 LBS

## 2018-10-16 DIAGNOSIS — Z71.9 ENCOUNTER FOR CONSULTATION: ICD-10-CM

## 2018-10-16 DIAGNOSIS — S29.019A THORACIC MYOFASCIAL STRAIN, INITIAL ENCOUNTER: Primary | ICD-10-CM

## 2018-10-16 PROCEDURE — 99214 OFFICE O/P EST MOD 30 MIN: CPT | Performed by: FAMILY MEDICINE

## 2018-10-16 NOTE — PROGRESS NOTES
HPI:    Patient ID: Ran Hassan. is a 41year old male.     Patient presents with:  Pain  Consult: NEEDS LETTER STATING HE CAN GET HIS DISABILITY CHECKS IN HIS NAME --- ALONG WITH SOCIAL SECURITY     Pt is moving in 3 weeks locally  Pt receiving s Bicep reflexes are 2+ on the right side and 2+ on the left side. Brachioradialis reflexes are 2+ on the right side and 2+ on the left side. Skin: Skin is warm and dry. Psychiatric: He has a normal mood and affect.  His speech is normal and beh

## 2018-10-16 NOTE — PATIENT INSTRUCTIONS
Note written for patient cognitive competency for finances, legal contracts, bill payment etc.  Discussed proper posture as well as thoracic and shoulder girdle stretches.   Continue cervical home exercise program.  May use moist heat and ibuprofen as neede

## 2019-01-07 ENCOUNTER — PATIENT OUTREACH (OUTPATIENT)
Dept: FAMILY MEDICINE CLINIC | Facility: CLINIC | Age: 39
End: 2019-01-07

## 2019-01-15 ENCOUNTER — OFFICE VISIT (OUTPATIENT)
Dept: FAMILY MEDICINE CLINIC | Facility: CLINIC | Age: 39
End: 2019-01-15
Payer: MEDICARE

## 2019-01-15 VITALS
WEIGHT: 160 LBS | HEART RATE: 72 BPM | SYSTOLIC BLOOD PRESSURE: 122 MMHG | BODY MASS INDEX: 25.11 KG/M2 | HEIGHT: 67 IN | DIASTOLIC BLOOD PRESSURE: 70 MMHG | TEMPERATURE: 98 F | OXYGEN SATURATION: 98 %

## 2019-01-15 DIAGNOSIS — H91.91 DECREASED HEARING OF RIGHT EAR: ICD-10-CM

## 2019-01-15 DIAGNOSIS — M47.812 ARTHROPATHY OF CERVICAL FACET JOINT: ICD-10-CM

## 2019-01-15 DIAGNOSIS — F17.200 TOBACCO DEPENDENCE: ICD-10-CM

## 2019-01-15 DIAGNOSIS — S16.1XXD CERVICAL MYOFASCIAL STRAIN, SUBSEQUENT ENCOUNTER: ICD-10-CM

## 2019-01-15 DIAGNOSIS — Z00.00 ENCOUNTER FOR ANNUAL HEALTH EXAMINATION: Primary | ICD-10-CM

## 2019-01-15 DIAGNOSIS — F41.9 CHRONIC ANXIETY: ICD-10-CM

## 2019-01-15 DIAGNOSIS — F51.04 PSYCHOPHYSIOLOGICAL INSOMNIA: ICD-10-CM

## 2019-01-15 PROCEDURE — G0444 DEPRESSION SCREEN ANNUAL: HCPCS | Performed by: FAMILY MEDICINE

## 2019-01-15 PROCEDURE — G0439 PPPS, SUBSEQ VISIT: HCPCS | Performed by: FAMILY MEDICINE

## 2019-01-15 NOTE — H&P
Bhargav Rose. is a 41year old male Patient presents with:  Physical: Medicare AWV    HPI:   Pt voices concerns of chronic neck pain- has appt with pain specialist in [facility], decreased hearing right ear- will be scheduling appt w/ ENT.     Wt Readi daytime drowsiness, good appetite  SKIN: denies any unusual skin lesions or rashes  EYES:denies blurred vision or double vision, glasses/ contacts: none  ENT: denies nasal congestion, PND or ST, denies snoring and reported periods of apnea, + hearing defic rashes,no suspicious lesions, multiple tattoos right scapula, entire right arm, right lower leg, lower abdomen, left forearm  ENT: EAC:  Clear, TMs: intact, Nose: turbinates congested, septum: midline, discharge: none, Pharynx: class 1 air way  EYES:PERRLA HbgA1C     At Least  Annually for Diabetics No results found for: A1C No flowsheet data found.     Fasting Blood Sugar (FSB)   Patient must be diagnosed with one of the following:   • Hypertension   • Dyslipidemia   • Obesity (BMI ³30 kg/m2)   • Previous el uncomfortable but covered  Covered but uncomfortable   Glaucoma Screening      Ophthalmology Visit   Covered annually for Diabetics, people with Glaucoma family history,   Americans over age 48   Americans over age 72 No flowsheet data fou packet, including necessary form from the [facility]. http://[email or web address] [email or web address]. [place]/public/books/advin.htm  A link to the Qu[employer].  This site has a lot of good information including definitions of the dif

## 2019-01-15 NOTE — PATIENT INSTRUCTIONS
Brittany Foreman Jr.'s SCREENING SCHEDULE   Tests on this list are recommended by your physician but may not be covered, or covered at this frequency, by your insurer. Please check with your insurance carrier before scheduling to verify coverage.     SD abnormal There are no preventive care reminders to display for this patient. Update Health Maintenance if applicable    Flex Sigmoidoscopy Screen  Covered every 5 years No results found for this or any previous visit. No flowsheet data found.      Fecal Occ with your prescription benefits, but Medicare does not cover unless Medically needed    Zoster (Not covered by Medicare Part B) No orders found for this or any previous visit.  This may be covered with your pharmacy  prescription benefits     Recommended We

## 2019-03-05 VITALS — WEIGHT: 160 LBS | HEIGHT: 67 IN | BODY MASS INDEX: 25.11 KG/M2

## 2019-11-02 ENCOUNTER — OFFICE VISIT (OUTPATIENT)
Dept: FAMILY MEDICINE CLINIC | Facility: CLINIC | Age: 39
End: 2019-11-02
Payer: MEDICARE

## 2019-11-02 VITALS
HEIGHT: 66 IN | BODY MASS INDEX: 26.84 KG/M2 | DIASTOLIC BLOOD PRESSURE: 80 MMHG | WEIGHT: 167 LBS | HEART RATE: 76 BPM | SYSTOLIC BLOOD PRESSURE: 120 MMHG | TEMPERATURE: 98 F | OXYGEN SATURATION: 98 %

## 2019-11-02 DIAGNOSIS — K01.1 IMPACTED TOOTH: Primary | ICD-10-CM

## 2019-11-02 DIAGNOSIS — J34.2 DEVIATED NASAL SEPTUM: ICD-10-CM

## 2019-11-02 PROCEDURE — 99213 OFFICE O/P EST LOW 20 MIN: CPT | Performed by: FAMILY MEDICINE

## 2019-11-02 RX ORDER — CLINDAMYCIN HYDROCHLORIDE 300 MG/1
300 CAPSULE ORAL EVERY 6 HOURS SCHEDULED
Refills: 0 | COMMUNITY
Start: 2019-10-31

## 2019-11-02 RX ORDER — IBUPROFEN 800 MG/1
800 TABLET ORAL 3 TIMES DAILY
Refills: 0 | COMMUNITY
Start: 2019-10-31

## 2019-11-02 RX ORDER — GABAPENTIN 300 MG/1
300 CAPSULE ORAL 3 TIMES DAILY PRN
COMMUNITY
Start: 2019-11-01

## 2019-11-02 NOTE — PROGRESS NOTES
Tammie Selby. is a 46year old male. Patient presents with:  Referral: ENT---    HPI:   Has appt 11/13 w/ oral surgeon, pt states he has a tooth growing into sinus cavity, pt wondering about seeing an ENT instead but needs to have nasal septum stra erythema, pnd, or lesions, no gingival swelling or drainage, left temporalis discomfort to firm palpation  NECK: supple,no adenopathy,no bruits, no thyromegaly  LUNGS: clear to auscultation, no w/r/r  CARDIO: RRR without murmur, peripheral pulses intact

## 2019-11-13 ENCOUNTER — PATIENT OUTREACH (OUTPATIENT)
Dept: FAMILY MEDICINE CLINIC | Facility: CLINIC | Age: 39
End: 2019-11-13

## 2020-07-20 ENCOUNTER — TELEPHONE (OUTPATIENT)
Dept: FAMILY MEDICINE CLINIC | Facility: CLINIC | Age: 40
End: 2020-07-20

## 2020-09-04 ENCOUNTER — TELEPHONE (OUTPATIENT)
Dept: FAMILY MEDICINE CLINIC | Facility: CLINIC | Age: 40
End: 2020-09-04

## 2021-01-19 ENCOUNTER — MED REC SCAN ONLY (OUTPATIENT)
Dept: FAMILY MEDICINE CLINIC | Facility: CLINIC | Age: 41
End: 2021-01-19

## 2021-02-15 ENCOUNTER — MED REC SCAN ONLY (OUTPATIENT)
Dept: FAMILY MEDICINE CLINIC | Facility: CLINIC | Age: 41
End: 2021-02-15

## 2021-11-22 ENCOUNTER — TELEPHONE (OUTPATIENT)
Dept: FAMILY MEDICINE CLINIC | Facility: CLINIC | Age: 41
End: 2021-11-22

## 2021-11-22 NOTE — TELEPHONE ENCOUNTER
Per Dr. Aubrie Cabrera, patient has established with Dr. Anca Gordon at [facility]

## 2025-05-07 NOTE — PATIENT INSTRUCTIONS
I advised patient that I would be happy to write a referral to the ENT of his choice for his deviated nasal septum however I need to know who he plans to see. I advised patient that an oral surgeon would be most appropriate for an impacted molar.   Pedro ramos 07-May-2025 14:19

## (undated) DEVICE — REMOVER DURAPREP 3M

## (undated) DEVICE — CAP,BOUFFANT,SPUNBOND,BLUE,24": Brand: MEDLINE INDUSTRIES, INC.

## (undated) DEVICE — GLOVE SURG SENSICARE SZ 7-1/2

## (undated) DEVICE — BANDAID COVERLET 1X3

## (undated) DEVICE — 3M™ TEGADERM™ TRANSPARENT FILM DRESSING, 1626W, 4 IN X 4-3/4 IN (10 CM X 12 CM), 50 EACH/CARTON, 4 CARTON/CASE: Brand: 3M™ TEGADERM™

## (undated) DEVICE — PAIN TRAY: Brand: MEDLINE INDUSTRIES, INC.

## (undated) DEVICE — NEEDLE SPINAL 22X3-1/2 BLK

## (undated) DEVICE — GLOVE SURG SENSICARE SZ 7

## (undated) NOTE — LETTER
10/27/17    [address]  PRE-PROCEDURE INSTRUCTIONS WITH IV SEDATION     Appointment Date:    12/19/17    &    1/2/18  Arrival Time:                   1:45pm       &     1:45pm  Procedure Time:              2:45pm       &     2:45pm    Josr ? 81mg 24 hours  ? Greater than 81 mg (325mg) 7 days  ? Coumadin Procedure may be cancelled if INR is elevated. ? Epidural ____ - 7 days  ? Others 5 days  ? Excedrin (with aspirin) 7 days  ? Plavix (Clopidogrel)  ? Epidural ____ - 10 days  ?  Others 7 day What is a facet joint injection? A facet joint injection is an injection of an anti-inflammatory medication, or steroid into the facet joints. The facet joints are part of the bony framework of the spine.  They are small bony projections from one vertebra remember parts or all of the actual procedure. How is the facet joint injection performed? It is done with the patient on their stomach. All patients receiving sedation are monitored with EKG, blood pressure cuff and oxygen monitoring device.  Patients n keep follow up appointment to clearly communicate with your provider how you are feeling. Will the facet joint injection help me? It is very difficult to predict how helpful injections will be.  Generally, patients who have the symptoms described above wi

## (undated) NOTE — ED AVS SNAPSHOT
[facility] Emergency Department    [place] [address] [address]    Phone:  [phone]    Fax:  Pradeep Humphreys.    MRN: [id number]    Department:  [facility] Emergency Department   Date of Visit IF THERE IS ANY CHANGE OR WORSENING OF YOUR CONDITION, CALL YOUR PRIMARY CARE PHYSICIAN AT ONCE OR RETURN IMMEDIATELY TO THE EMERGENCY DEPARTMENT.     If you have been prescribed any medication(s), please fill your prescription right away and begin taking t

## (undated) NOTE — ED AVS SNAPSHOT
[facility] Emergency Department    [facility] [address] [address]    Phone:  [phone]    Fax:  [phone]           Mr. Denzel Beck.    MRN: [id number]    Department:  [facility] Emergency Department   Date of V IF THERE IS ANY CHANGE OR WORSENING OF YOUR CONDITION, CALL YOUR PRIMARY CARE PHYSICIAN AT ONCE OR RETURN IMMEDIATELY TO THE EMERGENCY DEPARTMENT.     If you have been prescribed any medication(s), please fill your prescription right away and begin taking t

## (undated) NOTE — ED AVS SNAPSHOT
[facility] Emergency Department    [facility] [address] [address]    Phone:  [phone]    Fax:  [phone]           Vega Nunu Lowe.    MRN: [id number]    Department:  [facility] Emergency Department   Date of V IF THERE IS ANY CHANGE OR WORSENING OF YOUR CONDITION, CALL YOUR PRIMARY CARE PHYSICIAN AT ONCE OR RETURN IMMEDIATELY TO THE EMERGENCY DEPARTMENT.     If you have been prescribed any medication(s), please fill your prescription right away and begin taking t

## (undated) NOTE — LETTER
10/16/18    Carlos Longoria.  8/10/1980      To whom it may concern,    Mr Franklin Arguelles is cognitively competent to manage his own finances, endorse his own checks, enter into contracts and sign legal documents. Sincerely,    Vane Robles.  Sakina Tripp, , FA

## (undated) NOTE — LETTER
07/13/18    Daniel Castrejon.  8/10/1980    To whom it may concern,    I am referring Mr Sailaja Riley to [facility] for behavioral health evaluation and individual therapy for bipolar disorder. Sincerely,  Ryan Aguirre.  Destini Bejarano, GEOFP

## (undated) NOTE — ED AVS SNAPSHOT
[facility] Emergency Department    [facility] [address] [address]    Phone:  [phone]    Fax:  [phone]            Tawnya Tricias.    MRN: [id number]    Department:  [facility] Emergency Department   Date of V Insurance plans vary and the physician(s) referred by the ER may not be covered by your plan. Please contact your insurance company to determine coverage for follow-up care and referrals.     [address] [facility] Emergency Department  Main [phone]  Pediatric If the emergency physician has read X-rays, these will be re-interpreted by a radiologist.  If there is a significant change in your reading, you will be contacted. Please make sure we have your correct phone number before you leave.  After you leave, you s and ask to get set up for an insurance coverage that is in-network with Harvey Mock.         [facility]

## (undated) NOTE — MR AVS SNAPSHOT
[facility]  [address] [address]  [phone]               Thank you for choosing us for your health care visit with Jose M Braswell. Emir Morgan DO.   We are glad to serve you and happy to provide you with this sum

## (undated) NOTE — MR AVS SNAPSHOT
[facility]  [address] [address]  [phone]               Thank you for choosing us for your health care visit with Steve Jiménez. Colleen Edgar, DO.   We are glad to serve you and happy to provide you with this sum You have not been prescribed any medications.             [place]                  Visit [facility] online at  [email or web address]

## (undated) NOTE — MR AVS SNAPSHOT
[facility]  [address] [address]  [phone]               Thank you for choosing us for your health care visit with Rosamaria Jaimes DO.   We are glad to serve you and happy to provide you with this summ

## (undated) NOTE — ED AVS SNAPSHOT
Mr. Bisi Pinon. MRN: [id number]    Department:  [facility] Emergency Department   Date of Visit:  8/6/2017           Disclosure     Insurance plans vary and the physician(s) referred by the ER may not be covered by your plan.  Please c If you have been prescribed any medication(s), please fill your prescription right away and begin taking the medication(s) as directed    If the emergency physician has read X-rays, these will be re-interpreted by a radiologist.  If there is a significant

## (undated) NOTE — Clinical Note
01/18/2017          To Whom It May Concern,    Due to medical illness, please excuse NAKITA Méndez from work until 1/23/17 .     Sincerely,    Alexa Weathers D.O., FAAFP

## (undated) NOTE — ED AVS SNAPSHOT
[facility] Emergency Department    [place] [address] [address]    Phone:  [phone]    Fax:  Pradeep Humphreys.    MRN: [id number]    Department:  [facility] Emergency Department   Date of Visit therapy as tolerated. Norco as needed for more severe pain. Flexeril as needed. Do not drive on these medications.   If he develop any upper extremity numbness/weakness or new symptoms including severe headache, dizziness or nausea, please return to the IF THERE IS ANY CHANGE OR WORSENING OF YOUR CONDITION, CALL YOUR PRIMARY CARE PHYSICIAN AT ONCE OR RETURN IMMEDIATELY TO THE EMERGENCY DEPARTMENT.     If you have been prescribed any medication(s), please fill your prescription right away and begin taking Patient [address] to help you get signed up for insurance coverage. Patient [address] is a Federal Navigator program that can help with your Affordable Care Act coverage, as well as all types of Medicaid plans.   To get signed up and covere acute fracture identified. 1 mm of retrolisthesis of C3 relative to C2 noted at the vertebral body level. The spinal   laminar lines are normally aligned. Mild disc narrowing C4-C5. Tiny endplate spurs at Z0-B1. No prevertebral soft tissue swelling.      Melvin Barrera visit,  view other health information, and more. To sign up or find more information, go to [email or web address] and click on the Sign Up Now link in the [employer] box.      Enter your [employer] Activation Code exactly as it appears below along with yo

## (undated) NOTE — ED AVS SNAPSHOT
[facility] Emergency Department    [place] [address]    Phone:  [phone]    Fax:  [phone]           Mr. Dar Herrera.    MRN: [id number]    Department:  [facility] Emergency Department   Date of V take both doses of the new and old medication. ONLY take the dose prescribed today.             Where to Get Your Medications      You can get these medications from any pharmacy     Bring a paper prescription for each of these medications    - HYDROcodone tell this physician (or your personal doctor if your instructions are to return to your personal doctor) about any new or lasting problems. The primary care or specialist physician will see patients referred from the [facility] Emergency Department.  Darnell Clinton - If you are a smoker or have smoked in the last 12 months, we encourage you to explore options for quitting.     - If you have concerns related to behavioral health issues or thoughts of harming yourself, contact [address] [facility] a PATIENT STATED HISTORY: (As transcribed by Technologist)  Complaining of posterior neck and upper back pain x 6 month. Worst in the morning. Denies any numbness in the extremities.  Pt has lots of pain when turning his neck and pain goes up both shoulder